# Patient Record
(demographics unavailable — no encounter records)

---

## 2025-06-26 NOTE — PHYSICAL EXAM
[General Appearance - Well Developed] : well developed [General Appearance - Well Nourished] : well nourished [General Appearance - In No Acute Distress] : no acute distress [Penis Abnormality] : normal uncircumcised penis [Epididymis] : the epididymides were normal [Testes Tenderness] : no tenderness of the testes [Prostate Tenderness] : the prostate was not tender [Testes Mass (___cm)] : there were no testicular masses [Prostate Size ___ (0-4)] : prostate size [unfilled] (scale: 0-4) [Oriented To Time, Place, And Person] : oriented to person, place, and time

## 2025-06-26 NOTE — HISTORY OF PRESENT ILLNESS
[None] : None [FreeTextEntry1] : Wants his prostate checked.  Last checked in [place] last year.  Denies problems, Voiding well [Dysuria] : no dysuria [Hematuria - Gross] : no gross hematuria